# Patient Record
Sex: FEMALE | Race: OTHER | HISPANIC OR LATINO | URBAN - METROPOLITAN AREA
[De-identification: names, ages, dates, MRNs, and addresses within clinical notes are randomized per-mention and may not be internally consistent; named-entity substitution may affect disease eponyms.]

---

## 2020-01-01 ENCOUNTER — OUTPATIENT (OUTPATIENT)
Dept: OUTPATIENT SERVICES | Facility: HOSPITAL | Age: 25
LOS: 1 days | Discharge: HOME | End: 2020-01-01
Payer: OTHER GOVERNMENT

## 2020-01-01 VITALS
HEART RATE: 85 BPM | DIASTOLIC BLOOD PRESSURE: 81 MMHG | SYSTOLIC BLOOD PRESSURE: 131 MMHG | RESPIRATION RATE: 17 BRPM | TEMPERATURE: 99 F

## 2020-01-01 VITALS — HEART RATE: 85 BPM | SYSTOLIC BLOOD PRESSURE: 131 MMHG | DIASTOLIC BLOOD PRESSURE: 81 MMHG

## 2020-01-01 PROCEDURE — 99282 EMERGENCY DEPT VISIT SF MDM: CPT | Mod: 25

## 2020-01-01 PROCEDURE — 59025 FETAL NON-STRESS TEST: CPT | Mod: 26

## 2020-01-01 NOTE — OB PROVIDER TRIAGE NOTE - HISTORY OF PRESENT ILLNESS
25 yo  @39w w/ EDC 20 by LMP and c/w 1st tri sono presents to L&D with leakage of clear fluid since 2300. Has also had ctx since 1500, now q2min, 5/10 intensity. Denies VB. Good fetal movements. PMD in [place]. Was checked today, 1cm in office. No complications during this pregnancy. GBS neg.

## 2020-01-01 NOTE — OB PROVIDER TRIAGE NOTE - ADDITIONAL INSTRUCTIONS
- f/up at next scheduled appt with PMD  - labor precautions  - [facility] instructions, maternal hydration encouraged

## 2020-01-01 NOTE — OB PROVIDER TRIAGE NOTE - NSOBPROVIDERNOTE_OBGYN_ALL_OB_FT
23 yo  @39w, GBS neg, not ruptured, in early labor.    - d/c to home  - f/up at next scheduled appt with PMD  - labor precautions  - [facility] instructions, maternal hydration encouraged    Dr. Reyes and Dr. Arteaga aware.

## 2020-01-01 NOTE — OB PROVIDER TRIAGE NOTE - NSHPPHYSICALEXAM_GEN_ALL_CORE
Vital Signs Last 24 Hrs  T(F): 99 (01 Jan 2020 00:48), Max: 99 (01 Jan 2020 00:48)  HR: 85 (01 Jan 2020 00:47) (85 - 85)  BP: 131/81 (01 Jan 2020 00:47) (131/81 - 131/81)    gen: AAOx3, nad  EFM: 130/mod mackenzie/+accel  toco: q2min  SVE: 2/50/-2, vtx, intact  abd: soft, nontender, gravid, palpable contractions  speculum: no discharge or bleeding, neg pooling/nitrazine/ferning  BSS: vtx, ant fundal placenta, MVP 5.5cm, BPP 8/8